# Patient Record
Sex: FEMALE | URBAN - METROPOLITAN AREA
[De-identification: names, ages, dates, MRNs, and addresses within clinical notes are randomized per-mention and may not be internally consistent; named-entity substitution may affect disease eponyms.]

---

## 2023-08-25 ENCOUNTER — WEB ENCOUNTER (OUTPATIENT)
Dept: URBAN - METROPOLITAN AREA CLINIC 40 | Facility: CLINIC | Age: 58
End: 2023-08-25

## 2023-08-31 ENCOUNTER — LAB OUTSIDE AN ENCOUNTER (OUTPATIENT)
Dept: URBAN - METROPOLITAN AREA CLINIC 40 | Facility: CLINIC | Age: 58
End: 2023-08-31

## 2023-08-31 ENCOUNTER — DASHBOARD ENCOUNTERS (OUTPATIENT)
Age: 58
End: 2023-08-31

## 2023-08-31 ENCOUNTER — OFFICE VISIT (OUTPATIENT)
Dept: URBAN - METROPOLITAN AREA CLINIC 40 | Facility: CLINIC | Age: 58
End: 2023-08-31
Payer: COMMERCIAL

## 2023-08-31 VITALS
WEIGHT: 149 LBS | DIASTOLIC BLOOD PRESSURE: 84 MMHG | HEART RATE: 63 BPM | SYSTOLIC BLOOD PRESSURE: 116 MMHG | BODY MASS INDEX: 26.4 KG/M2 | HEIGHT: 63 IN

## 2023-08-31 DIAGNOSIS — K86.89 PANCREATIC DUCT DILATED: ICD-10-CM

## 2023-08-31 DIAGNOSIS — R79.89 CREATININE ELEVATION: ICD-10-CM

## 2023-08-31 DIAGNOSIS — R93.5 ABNORMAL CT OF THE ABDOMEN: ICD-10-CM

## 2023-08-31 DIAGNOSIS — R89.9 ABNORMAL LABORATORY TEST: ICD-10-CM

## 2023-08-31 DIAGNOSIS — K29.70 GASTRITIS: ICD-10-CM

## 2023-08-31 PROCEDURE — 99203 OFFICE O/P NEW LOW 30 MIN: CPT | Performed by: PHYSICIAN ASSISTANT

## 2023-08-31 RX ORDER — LISINOPRIL AND HYDROCHLOROTHIAZIDE 20; 12.5 MG/1; MG/1
1 TABLET TABLET ORAL ONCE A DAY
Status: ACTIVE | COMMUNITY

## 2023-08-31 RX ORDER — FAMOTIDINE 40 MG/1
1 TABLET AT BEDTIME TABLET, FILM COATED ORAL ONCE A DAY
Qty: 30 TABLET | Refills: 1 | OUTPATIENT
Start: 2023-08-31

## 2023-08-31 RX ORDER — OMEPRAZOLE 40 MG/1
1 CAPSULE 30 MINUTES BEFORE MORNING MEAL CAPSULE, DELAYED RELEASE ORAL ONCE A DAY
Status: ON HOLD | COMMUNITY

## 2023-08-31 RX ORDER — DICYCLOMINE HYDROCHLORIDE 20 MG/1
1 TABLET TABLET ORAL THREE TIMES A DAY
Status: ON HOLD | COMMUNITY

## 2023-08-31 RX ORDER — ATORVASTATIN CALCIUM 10 MG/1
1 TABLET TABLET, FILM COATED ORAL ONCE A DAY
Status: ACTIVE | COMMUNITY

## 2023-08-31 RX ORDER — ONDANSETRON HYDROCHLORIDE 4 MG/1
1 TABLET TABLET, FILM COATED ORAL ONCE A DAY
Status: ON HOLD | COMMUNITY

## 2023-08-31 NOTE — HPI-TODAY'S VISIT:
Ms. Browning is a 58-year old Black female who presents to clinic after ED visit 7/22/23. Recent gastroenteritis with [facility] ED visit for abdominal pain, nausea and indigestion, now resolved. History of abnormal pancreatic duct with prior imaging, recently with CT A/P.  Diverticulosis noted, but no acute diverticulitis. She had EUS by Dr. Bowden, [facility] 12/2015. Focal dilation of the pancreatic duct (chronically increased from 5-7mm) may reflect fusion abnormality or less likely perhaps the very beginning of a main duct IPMN. The patient will need ongoing observation, especially for symptoms. May benefit from surveillance imaging in 12 months. H/H with recent July labs in [facility] ED, elevated as well as Plts. No evidence of anemia. U/A without UTI. Lipase normal and HFP normal. She has no true complaints today.  She admits that she has had heartburn and indigestion intermittently for many years but is not on any acid reduction therapy.  Denies nausea, vomiting or dysphagia.  No current abdominal pain or change in bowels.  No rectal bleeding or melena.  No prior EGD.  She tells me that she did have a colonoscopy in the last 2 to 3 years locally and the exam was normal.  She believes she will be due in 5 years following her prior exam.  She is not using any NSAIDs regularly.  Overall, she believes she is made with complete recovery from her incident back in July where she had consumed some lamb meat outside of the home and then became ill.  Appetite is preserved and weight stable.  She is not using any tobacco, alcohol.

## 2023-09-01 LAB
BUN/CREATININE RATIO: (no result)
CALCIUM: 10.4
CARBON DIOXIDE: 29
CHLORIDE: 101
CREATININE: 0.72
EGFR: 97
GLUCOSE: 94
HEMATOCRIT: 43
HEMOGLOBIN: 12.9
MCH: 24.3
MCHC: 30
MCV: 81.1
MPV: 9.3
PLATELET COUNT: 379
POTASSIUM: 3.6
RDW: 15.4
RED BLOOD CELL COUNT: 5.3
SODIUM: 138
UREA NITROGEN (BUN): 12
WHITE BLOOD CELL COUNT: 4.9

## 2023-10-23 ENCOUNTER — OFFICE VISIT (OUTPATIENT)
Dept: URBAN - METROPOLITAN AREA CLINIC 74 | Facility: CLINIC | Age: 58
End: 2023-10-23

## 2023-12-01 ENCOUNTER — TELEPHONE ENCOUNTER (OUTPATIENT)
Dept: URBAN - METROPOLITAN AREA CLINIC 40 | Facility: CLINIC | Age: 58
End: 2023-12-01

## 2024-12-05 ENCOUNTER — OFFICE VISIT (OUTPATIENT)
Dept: URBAN - METROPOLITAN AREA CLINIC 40 | Facility: CLINIC | Age: 59
End: 2024-12-05